# Patient Record
Sex: MALE | Race: WHITE | Employment: OTHER | URBAN - METROPOLITAN AREA
[De-identification: names, ages, dates, MRNs, and addresses within clinical notes are randomized per-mention and may not be internally consistent; named-entity substitution may affect disease eponyms.]

---

## 2019-05-24 ENCOUNTER — HOSPITAL ENCOUNTER (OUTPATIENT)
Facility: CLINIC | Age: 74
Discharge: HOME | End: 2019-05-24
Attending: COLON & RECTAL SURGERY | Admitting: COLON & RECTAL SURGERY
Payer: COMMERCIAL

## 2019-05-24 VITALS
WEIGHT: 207 LBS | DIASTOLIC BLOOD PRESSURE: 78 MMHG | HEART RATE: 61 BPM | BODY MASS INDEX: 30.66 KG/M2 | RESPIRATION RATE: 27 BRPM | HEIGHT: 69 IN | SYSTOLIC BLOOD PRESSURE: 122 MMHG | OXYGEN SATURATION: 95 %

## 2019-05-24 LAB — COLONOSCOPY: NORMAL

## 2019-05-24 PROCEDURE — 88305 TISSUE EXAM BY PATHOLOGIST: CPT | Performed by: COLON & RECTAL SURGERY

## 2019-05-24 PROCEDURE — 27210582 ZZH DEVICE CLIP RESOLUTION, EACH: Performed by: COLON & RECTAL SURGERY

## 2019-05-24 PROCEDURE — 88305 TISSUE EXAM BY PATHOLOGIST: CPT | Mod: 26 | Performed by: COLON & RECTAL SURGERY

## 2019-05-24 PROCEDURE — 25800030 ZZH RX IP 258 OP 636: Performed by: COLON & RECTAL SURGERY

## 2019-05-24 PROCEDURE — 99153 MOD SED SAME PHYS/QHP EA: CPT | Performed by: COLON & RECTAL SURGERY

## 2019-05-24 PROCEDURE — 25000128 H RX IP 250 OP 636: Performed by: COLON & RECTAL SURGERY

## 2019-05-24 PROCEDURE — G0500 MOD SEDAT ENDO SERVICE >5YRS: HCPCS | Performed by: COLON & RECTAL SURGERY

## 2019-05-24 PROCEDURE — 45385 COLONOSCOPY W/LESION REMOVAL: CPT | Performed by: COLON & RECTAL SURGERY

## 2019-05-24 PROCEDURE — 45380 COLONOSCOPY AND BIOPSY: CPT | Mod: XU | Performed by: COLON & RECTAL SURGERY

## 2019-05-24 RX ORDER — SODIUM CHLORIDE 9 MG/ML
INJECTION, SOLUTION INTRAVENOUS CONTINUOUS PRN
Status: DISCONTINUED | OUTPATIENT
Start: 2019-05-24 | End: 2019-05-24 | Stop reason: HOSPADM

## 2019-05-24 RX ORDER — NALOXONE HYDROCHLORIDE 0.4 MG/ML
.1-.4 INJECTION, SOLUTION INTRAMUSCULAR; INTRAVENOUS; SUBCUTANEOUS
Status: DISCONTINUED | OUTPATIENT
Start: 2019-05-24 | End: 2019-05-24 | Stop reason: HOSPADM

## 2019-05-24 RX ORDER — FLUMAZENIL 0.1 MG/ML
0.2 INJECTION, SOLUTION INTRAVENOUS
Status: DISCONTINUED | OUTPATIENT
Start: 2019-05-24 | End: 2019-05-24 | Stop reason: HOSPADM

## 2019-05-24 RX ORDER — ONDANSETRON 2 MG/ML
4 INJECTION INTRAMUSCULAR; INTRAVENOUS
Status: DISCONTINUED | OUTPATIENT
Start: 2019-05-24 | End: 2019-05-24 | Stop reason: HOSPADM

## 2019-05-24 RX ORDER — ONDANSETRON 4 MG/1
4 TABLET, ORALLY DISINTEGRATING ORAL EVERY 6 HOURS PRN
Status: DISCONTINUED | OUTPATIENT
Start: 2019-05-24 | End: 2019-05-24 | Stop reason: HOSPADM

## 2019-05-24 RX ORDER — FENTANYL CITRATE 50 UG/ML
INJECTION, SOLUTION INTRAMUSCULAR; INTRAVENOUS PRN
Status: DISCONTINUED | OUTPATIENT
Start: 2019-05-24 | End: 2019-05-24 | Stop reason: HOSPADM

## 2019-05-24 RX ORDER — LIDOCAINE 40 MG/G
CREAM TOPICAL
Status: DISCONTINUED | OUTPATIENT
Start: 2019-05-24 | End: 2019-05-24 | Stop reason: HOSPADM

## 2019-05-24 RX ORDER — ONDANSETRON 2 MG/ML
4 INJECTION INTRAMUSCULAR; INTRAVENOUS EVERY 6 HOURS PRN
Status: DISCONTINUED | OUTPATIENT
Start: 2019-05-24 | End: 2019-05-24 | Stop reason: HOSPADM

## 2019-05-24 RX ORDER — LISINOPRIL 10 MG/1
10 TABLET ORAL DAILY
COMMUNITY
End: 2021-01-01

## 2019-05-24 ASSESSMENT — MIFFLIN-ST. JEOR: SCORE: 1679.33

## 2019-05-24 NOTE — H&P
Pre-Endoscopy History and Physical     Amado Gutierrez MRN# [id number]   YOB: 1945 Age: 74 year old     Date of Procedure: (Not on file)  Primary care provider: No primary care provider on file.  Type of Endoscopy: Colonoscopy  Reason for Procedure: Positive cologuard  Type of Anesthesia Anticipated: Moderate Sedation    HPI:    Amado is a 74 year old male who will be undergoing the above procedure.      A history and physical has been performed. The patient's medications and allergies have been reviewed. The risks and benefits of the procedure and the sedation options and risks were discussed with the patient.  All questions were answered and informed consent was obtained.      He denies a personal or family history of anesthesia complications or bleeding disorders.     No Known Allergies       No current facility-administered medications on file prior to encounter.   Current Outpatient Medications on File Prior to Encounter:  lisinopril (PRINIVIL/ZESTRIL) 10 MG tablet Take 10 mg by mouth daily       There is no problem list on file for this patient.       Past Medical History:   Diagnosis Date     Hypertension         History reviewed. No pertinent surgical history.    Social History     Tobacco Use     Smoking status: Never Smoker     Smokeless tobacco: Never Used   Substance Use Topics     Alcohol use: Not on file       History reviewed. No pertinent family history.    REVIEW OF SYSTEMS:     5 point ROS negative except as noted above in HPI, including Gen., Resp., CV, GI &  system review.      PHYSICAL EXAM:   There were no vitals taken for this visit. There is no height or weight on file to calculate BMI.   GENERAL APPEARANCE: healthy and alert  MENTAL STATUS: alert  RESP: lungs clear to auscultation - no rales, rhonchi or wheezes  CV: regular rates and rhythm and normal S1 S2, no S3 or S4      IMPRESSION   ASA Class 2 - Mild systemic disease        PLAN:     Plan for colonoscopy. We discussed  the risks, benefits and alternatives and the patient wished to proceed.    The above has been forwarded to the consulting provider.      Eric Andrews MD  [facility]  Phone: [phone]  May 24, 2019

## 2019-05-28 LAB — COPATH REPORT: NORMAL

## 2021-01-01 ENCOUNTER — HOSPITAL ENCOUNTER (INPATIENT)
Facility: CLINIC | Age: 76
LOS: 2 days | Discharge: HOME HEALTH | DRG: 603 | End: 2021-10-18
Attending: EMERGENCY MEDICINE | Admitting: HOSPITALIST
Payer: COMMERCIAL

## 2021-01-01 ENCOUNTER — OFFICE VISIT (OUTPATIENT)
Dept: URGENT CARE | Facility: URGENT CARE | Age: 76
End: 2021-01-01
Payer: COMMERCIAL

## 2021-01-01 ENCOUNTER — APPOINTMENT (OUTPATIENT)
Dept: CT IMAGING | Facility: CLINIC | Age: 76
DRG: 603 | End: 2021-01-01
Attending: EMERGENCY MEDICINE
Payer: COMMERCIAL

## 2021-01-01 ENCOUNTER — APPOINTMENT (OUTPATIENT)
Dept: OCCUPATIONAL THERAPY | Facility: CLINIC | Age: 76
DRG: 603 | End: 2021-01-01
Attending: HOSPITALIST
Payer: COMMERCIAL

## 2021-01-01 VITALS
SYSTOLIC BLOOD PRESSURE: 136 MMHG | TEMPERATURE: 98.3 F | DIASTOLIC BLOOD PRESSURE: 88 MMHG | HEART RATE: 86 BPM | OXYGEN SATURATION: 95 %

## 2021-01-01 VITALS
BODY MASS INDEX: 30.14 KG/M2 | HEIGHT: 69 IN | DIASTOLIC BLOOD PRESSURE: 86 MMHG | OXYGEN SATURATION: 93 % | RESPIRATION RATE: 16 BRPM | TEMPERATURE: 97.5 F | WEIGHT: 203.5 LBS | SYSTOLIC BLOOD PRESSURE: 158 MMHG | HEART RATE: 65 BPM

## 2021-01-01 DIAGNOSIS — L03.211 FACIAL CELLULITIS: Primary | ICD-10-CM

## 2021-01-01 DIAGNOSIS — L03.90 CELLULITIS, UNSPECIFIED CELLULITIS SITE: ICD-10-CM

## 2021-01-01 DIAGNOSIS — L03.211 CELLULITIS OF FACE: Primary | ICD-10-CM

## 2021-01-01 LAB
ALBUMIN SERPL-MCNC: 3 G/DL (ref 3.4–5)
ALBUMIN SERPL-MCNC: 3.2 G/DL (ref 3.4–5)
ALBUMIN SERPL-MCNC: 3.3 G/DL (ref 3.4–5)
ALP SERPL-CCNC: 55 U/L (ref 40–150)
ALP SERPL-CCNC: 56 U/L (ref 40–150)
ALP SERPL-CCNC: 65 U/L (ref 40–150)
ALT SERPL W P-5'-P-CCNC: 33 U/L (ref 0–70)
ALT SERPL W P-5'-P-CCNC: 34 U/L (ref 0–70)
ALT SERPL W P-5'-P-CCNC: 35 U/L (ref 0–70)
ANION GAP SERPL CALCULATED.3IONS-SCNC: 5 MMOL/L (ref 3–14)
ANION GAP SERPL CALCULATED.3IONS-SCNC: 6 MMOL/L (ref 3–14)
ANION GAP SERPL CALCULATED.3IONS-SCNC: 7 MMOL/L (ref 3–14)
AST SERPL W P-5'-P-CCNC: 20 U/L (ref 0–45)
AST SERPL W P-5'-P-CCNC: 23 U/L (ref 0–45)
AST SERPL W P-5'-P-CCNC: 32 U/L (ref 0–45)
BACTERIA BLD CULT: NO GROWTH
BACTERIA BLD CULT: NO GROWTH
BASOPHILS # BLD AUTO: 0 10E3/UL (ref 0–0.2)
BASOPHILS NFR BLD AUTO: 0 %
BILIRUB SERPL-MCNC: 0.5 MG/DL (ref 0.2–1.3)
BILIRUB SERPL-MCNC: 0.6 MG/DL (ref 0.2–1.3)
BILIRUB SERPL-MCNC: 0.7 MG/DL (ref 0.2–1.3)
BUN SERPL-MCNC: 10 MG/DL (ref 7–30)
BUN SERPL-MCNC: 17 MG/DL (ref 7–30)
BUN SERPL-MCNC: 9 MG/DL (ref 7–30)
CALCIUM SERPL-MCNC: 8.5 MG/DL (ref 8.5–10.1)
CALCIUM SERPL-MCNC: 8.8 MG/DL (ref 8.5–10.1)
CALCIUM SERPL-MCNC: 9 MG/DL (ref 8.5–10.1)
CHLORIDE BLD-SCNC: 103 MMOL/L (ref 94–109)
CHLORIDE BLD-SCNC: 105 MMOL/L (ref 94–109)
CHLORIDE BLD-SCNC: 106 MMOL/L (ref 94–109)
CO2 SERPL-SCNC: 21 MMOL/L (ref 20–32)
CO2 SERPL-SCNC: 25 MMOL/L (ref 20–32)
CO2 SERPL-SCNC: 26 MMOL/L (ref 20–32)
CREAT SERPL-MCNC: 0.77 MG/DL (ref 0.66–1.25)
CREAT SERPL-MCNC: 0.8 MG/DL (ref 0.66–1.25)
CREAT SERPL-MCNC: 0.88 MG/DL (ref 0.66–1.25)
CREAT SERPL-MCNC: 0.91 MG/DL (ref 0.66–1.25)
CRP SERPL-MCNC: 105 MG/L (ref 0–8)
CRP SERPL-MCNC: 33.5 MG/L (ref 0–8)
CRP SERPL-MCNC: 68.1 MG/L (ref 0–8)
EOSINOPHIL # BLD AUTO: 0.2 10E3/UL (ref 0–0.7)
EOSINOPHIL NFR BLD AUTO: 2 %
ERYTHROCYTE [DISTWIDTH] IN BLOOD BY AUTOMATED COUNT: 12.2 % (ref 10–15)
ERYTHROCYTE [DISTWIDTH] IN BLOOD BY AUTOMATED COUNT: 12.3 % (ref 10–15)
ERYTHROCYTE [DISTWIDTH] IN BLOOD BY AUTOMATED COUNT: 12.4 % (ref 10–15)
ERYTHROCYTE [SEDIMENTATION RATE] IN BLOOD BY WESTERGREN METHOD: 51 MM/HR (ref 0–20)
ERYTHROCYTE [SEDIMENTATION RATE] IN BLOOD BY WESTERGREN METHOD: 53 MM/HR (ref 0–20)
ERYTHROCYTE [SEDIMENTATION RATE] IN BLOOD BY WESTERGREN METHOD: 55 MM/HR (ref 0–20)
GFR SERPL CREATININE-BSD FRML MDRD: 83 ML/MIN/1.73M2
GFR SERPL CREATININE-BSD FRML MDRD: 85 ML/MIN/1.73M2
GFR SERPL CREATININE-BSD FRML MDRD: 88 ML/MIN/1.73M2
GFR SERPL CREATININE-BSD FRML MDRD: 89 ML/MIN/1.73M2
GLUCOSE BLD-MCNC: 103 MG/DL (ref 70–99)
GLUCOSE BLD-MCNC: 122 MG/DL (ref 70–99)
GLUCOSE BLD-MCNC: 146 MG/DL (ref 70–99)
HCT VFR BLD AUTO: 39.1 % (ref 40–53)
HCT VFR BLD AUTO: 40 % (ref 40–53)
HCT VFR BLD AUTO: 40.5 % (ref 40–53)
HGB BLD-MCNC: 13.1 G/DL (ref 13.3–17.7)
HGB BLD-MCNC: 13.7 G/DL (ref 13.3–17.7)
HGB BLD-MCNC: 14.1 G/DL (ref 13.3–17.7)
IMM GRANULOCYTES # BLD: 0.1 10E3/UL
IMM GRANULOCYTES NFR BLD: 1 %
LACTATE SERPL-SCNC: 0.6 MMOL/L (ref 0.7–2)
LYMPHOCYTES # BLD AUTO: 0.7 10E3/UL (ref 0.8–5.3)
LYMPHOCYTES NFR BLD AUTO: 7 %
MCH RBC QN AUTO: 32.8 PG (ref 26.5–33)
MCH RBC QN AUTO: 33.6 PG (ref 26.5–33)
MCH RBC QN AUTO: 33.6 PG (ref 26.5–33)
MCHC RBC AUTO-ENTMCNC: 33.5 G/DL (ref 31.5–36.5)
MCHC RBC AUTO-ENTMCNC: 34.3 G/DL (ref 31.5–36.5)
MCHC RBC AUTO-ENTMCNC: 34.8 G/DL (ref 31.5–36.5)
MCV RBC AUTO: 96 FL (ref 78–100)
MCV RBC AUTO: 98 FL (ref 78–100)
MCV RBC AUTO: 98 FL (ref 78–100)
MONOCYTES # BLD AUTO: 1.1 10E3/UL (ref 0–1.3)
MONOCYTES NFR BLD AUTO: 11 %
NEUTROPHILS # BLD AUTO: 7.3 10E3/UL (ref 1.6–8.3)
NEUTROPHILS NFR BLD AUTO: 79 %
NRBC # BLD AUTO: 0 10E3/UL
NRBC BLD AUTO-RTO: 0 /100
PLATELET # BLD AUTO: 165 10E3/UL (ref 150–450)
PLATELET # BLD AUTO: 188 10E3/UL (ref 150–450)
PLATELET # BLD AUTO: 208 10E3/UL (ref 150–450)
POTASSIUM BLD-SCNC: 3.9 MMOL/L (ref 3.4–5.3)
POTASSIUM BLD-SCNC: 4 MMOL/L (ref 3.4–5.3)
POTASSIUM BLD-SCNC: 4.1 MMOL/L (ref 3.4–5.3)
PROT SERPL-MCNC: 7.6 G/DL (ref 6.8–8.8)
PROT SERPL-MCNC: 7.9 G/DL (ref 6.8–8.8)
PROT SERPL-MCNC: 8.1 G/DL (ref 6.8–8.8)
RBC # BLD AUTO: 4 10E6/UL (ref 4.4–5.9)
RBC # BLD AUTO: 4.08 10E6/UL (ref 4.4–5.9)
RBC # BLD AUTO: 4.2 10E6/UL (ref 4.4–5.9)
SARS-COV-2 RNA RESP QL NAA+PROBE: NEGATIVE
SODIUM SERPL-SCNC: 133 MMOL/L (ref 133–144)
SODIUM SERPL-SCNC: 135 MMOL/L (ref 133–144)
SODIUM SERPL-SCNC: 136 MMOL/L (ref 133–144)
TSH SERPL DL<=0.005 MIU/L-ACNC: 1.1 MU/L (ref 0.4–4)
WBC # BLD AUTO: 6.2 10E3/UL (ref 4–11)
WBC # BLD AUTO: 6.9 10E3/UL (ref 4–11)
WBC # BLD AUTO: 9.3 10E3/UL (ref 4–11)

## 2021-01-01 PROCEDURE — 250N000011 HC RX IP 250 OP 636: Performed by: EMERGENCY MEDICINE

## 2021-01-01 PROCEDURE — 36415 COLL VENOUS BLD VENIPUNCTURE: CPT | Performed by: HOSPITALIST

## 2021-01-01 PROCEDURE — 250N000011 HC RX IP 250 OP 636: Performed by: HOSPITALIST

## 2021-01-01 PROCEDURE — 85027 COMPLETE CBC AUTOMATED: CPT | Performed by: HOSPITALIST

## 2021-01-01 PROCEDURE — C9803 HOPD COVID-19 SPEC COLLECT: HCPCS

## 2021-01-01 PROCEDURE — 87040 BLOOD CULTURE FOR BACTERIA: CPT | Performed by: EMERGENCY MEDICINE

## 2021-01-01 PROCEDURE — 96366 THER/PROPH/DIAG IV INF ADDON: CPT

## 2021-01-01 PROCEDURE — 99285 EMERGENCY DEPT VISIT HI MDM: CPT | Mod: 25

## 2021-01-01 PROCEDURE — 82040 ASSAY OF SERUM ALBUMIN: CPT | Performed by: HOSPITALIST

## 2021-01-01 PROCEDURE — 70487 CT MAXILLOFACIAL W/DYE: CPT

## 2021-01-01 PROCEDURE — 86140 C-REACTIVE PROTEIN: CPT | Performed by: HOSPITALIST

## 2021-01-01 PROCEDURE — 84443 ASSAY THYROID STIM HORMONE: CPT | Performed by: HOSPITALIST

## 2021-01-01 PROCEDURE — 250N000009 HC RX 250: Performed by: EMERGENCY MEDICINE

## 2021-01-01 PROCEDURE — 83605 ASSAY OF LACTIC ACID: CPT | Performed by: EMERGENCY MEDICINE

## 2021-01-01 PROCEDURE — 85025 COMPLETE CBC W/AUTO DIFF WBC: CPT | Performed by: EMERGENCY MEDICINE

## 2021-01-01 PROCEDURE — 250N000013 HC RX MED GY IP 250 OP 250 PS 637: Performed by: HOSPITALIST

## 2021-01-01 PROCEDURE — 99223 1ST HOSP IP/OBS HIGH 75: CPT | Mod: AI | Performed by: HOSPITALIST

## 2021-01-01 PROCEDURE — 258N000003 HC RX IP 258 OP 636: Performed by: HOSPITALIST

## 2021-01-01 PROCEDURE — 99238 HOSP IP/OBS DSCHRG MGMT 30/<: CPT | Performed by: INTERNAL MEDICINE

## 2021-01-01 PROCEDURE — 36415 COLL VENOUS BLD VENIPUNCTURE: CPT | Performed by: EMERGENCY MEDICINE

## 2021-01-01 PROCEDURE — 85652 RBC SED RATE AUTOMATED: CPT | Performed by: HOSPITALIST

## 2021-01-01 PROCEDURE — 120N000001 HC R&B MED SURG/OB

## 2021-01-01 PROCEDURE — 97535 SELF CARE MNGMENT TRAINING: CPT | Mod: GO

## 2021-01-01 PROCEDURE — 96375 TX/PRO/DX INJ NEW DRUG ADDON: CPT

## 2021-01-01 PROCEDURE — 87635 SARS-COV-2 COVID-19 AMP PRB: CPT | Performed by: EMERGENCY MEDICINE

## 2021-01-01 PROCEDURE — 96367 TX/PROPH/DG ADDL SEQ IV INF: CPT

## 2021-01-01 PROCEDURE — 250N000011 HC RX IP 250 OP 636: Performed by: INTERNAL MEDICINE

## 2021-01-01 PROCEDURE — 258N000003 HC RX IP 258 OP 636: Performed by: EMERGENCY MEDICINE

## 2021-01-01 PROCEDURE — 97165 OT EVAL LOW COMPLEX 30 MIN: CPT | Mod: GO

## 2021-01-01 PROCEDURE — 99203 OFFICE O/P NEW LOW 30 MIN: CPT

## 2021-01-01 PROCEDURE — 99207 PR MOONLIGHTING INDICATOR: CPT | Performed by: HOSPITALIST

## 2021-01-01 PROCEDURE — 99233 SBSQ HOSP IP/OBS HIGH 50: CPT | Performed by: HOSPITALIST

## 2021-01-01 PROCEDURE — 82565 ASSAY OF CREATININE: CPT | Performed by: HOSPITALIST

## 2021-01-01 PROCEDURE — 96365 THER/PROPH/DIAG IV INF INIT: CPT | Mod: 59

## 2021-01-01 PROCEDURE — 80053 COMPREHEN METABOLIC PANEL: CPT | Performed by: EMERGENCY MEDICINE

## 2021-01-01 RX ORDER — CLINDAMYCIN PHOSPHATE 900 MG/50ML
900 INJECTION, SOLUTION INTRAVENOUS ONCE
Status: COMPLETED | OUTPATIENT
Start: 2021-01-01 | End: 2021-01-01

## 2021-01-01 RX ORDER — CEPHALEXIN 500 MG/1
500 CAPSULE ORAL 4 TIMES DAILY
Qty: 28 CAPSULE | Refills: 0 | Status: SHIPPED | OUTPATIENT
Start: 2021-01-01 | End: 2021-01-01

## 2021-01-01 RX ORDER — MORPHINE SULFATE 4 MG/ML
6 INJECTION, SOLUTION INTRAMUSCULAR; INTRAVENOUS ONCE
Status: COMPLETED | OUTPATIENT
Start: 2021-01-01 | End: 2021-01-01

## 2021-01-01 RX ORDER — PROCHLORPERAZINE MALEATE 5 MG
5 TABLET ORAL EVERY 6 HOURS PRN
Status: DISCONTINUED | OUTPATIENT
Start: 2021-01-01 | End: 2021-01-01 | Stop reason: HOSPADM

## 2021-01-01 RX ORDER — IOPAMIDOL 755 MG/ML
80 INJECTION, SOLUTION INTRAVASCULAR ONCE
Status: COMPLETED | OUTPATIENT
Start: 2021-01-01 | End: 2021-01-01

## 2021-01-01 RX ORDER — OMEPRAZOLE 10 MG/1
20 CAPSULE, DELAYED RELEASE ORAL DAILY
COMMUNITY
End: 2021-01-01

## 2021-01-01 RX ORDER — DONEPEZIL HYDROCHLORIDE 10 MG/1
10 TABLET, FILM COATED ORAL AT BEDTIME
COMMUNITY

## 2021-01-01 RX ORDER — ACETAMINOPHEN 325 MG/1
325 TABLET ORAL EVERY 4 HOURS PRN
Status: DISCONTINUED | OUTPATIENT
Start: 2021-01-01 | End: 2021-01-01 | Stop reason: HOSPADM

## 2021-01-01 RX ORDER — ONDANSETRON 2 MG/ML
4 INJECTION INTRAMUSCULAR; INTRAVENOUS EVERY 6 HOURS PRN
Status: DISCONTINUED | OUTPATIENT
Start: 2021-01-01 | End: 2021-01-01 | Stop reason: HOSPADM

## 2021-01-01 RX ORDER — ONDANSETRON 2 MG/ML
4 INJECTION INTRAMUSCULAR; INTRAVENOUS EVERY 30 MIN PRN
Status: DISCONTINUED | OUTPATIENT
Start: 2021-01-01 | End: 2021-01-01

## 2021-01-01 RX ORDER — LIDOCAINE 40 MG/G
CREAM TOPICAL
Status: DISCONTINUED | OUTPATIENT
Start: 2021-01-01 | End: 2021-01-01 | Stop reason: HOSPADM

## 2021-01-01 RX ORDER — ACETAMINOPHEN 650 MG/1
650 SUPPOSITORY RECTAL EVERY 6 HOURS PRN
Qty: 30 SUPPOSITORY | Refills: 0 | Status: SHIPPED | OUTPATIENT
Start: 2021-01-01

## 2021-01-01 RX ORDER — LISINOPRIL 40 MG/1
40 TABLET ORAL DAILY
COMMUNITY

## 2021-01-01 RX ORDER — MULTIVITAMIN,THERAPEUTIC
1 TABLET ORAL DAILY
COMMUNITY

## 2021-01-01 RX ORDER — PROCHLORPERAZINE 25 MG
12.5 SUPPOSITORY, RECTAL RECTAL EVERY 12 HOURS PRN
Status: DISCONTINUED | OUTPATIENT
Start: 2021-01-01 | End: 2021-01-01 | Stop reason: HOSPADM

## 2021-01-01 RX ORDER — ACETAMINOPHEN 325 MG/1
650 TABLET ORAL EVERY 6 HOURS PRN
Status: DISCONTINUED | OUTPATIENT
Start: 2021-01-01 | End: 2021-01-01

## 2021-01-01 RX ORDER — PIPERACILLIN SODIUM, TAZOBACTAM SODIUM 3; .375 G/15ML; G/15ML
3.38 INJECTION, POWDER, LYOPHILIZED, FOR SOLUTION INTRAVENOUS EVERY 6 HOURS
Status: DISCONTINUED | OUTPATIENT
Start: 2021-01-01 | End: 2021-01-01

## 2021-01-01 RX ORDER — ACETAMINOPHEN 650 MG/1
650 SUPPOSITORY RECTAL EVERY 6 HOURS PRN
Status: DISCONTINUED | OUTPATIENT
Start: 2021-01-01 | End: 2021-01-01 | Stop reason: HOSPADM

## 2021-01-01 RX ORDER — AMPICILLIN AND SULBACTAM 2; 1 G/1; G/1
3 INJECTION, POWDER, FOR SOLUTION INTRAMUSCULAR; INTRAVENOUS EVERY 6 HOURS
Status: DISCONTINUED | OUTPATIENT
Start: 2021-01-01 | End: 2021-01-01 | Stop reason: HOSPADM

## 2021-01-01 RX ORDER — ONDANSETRON 4 MG/1
4 TABLET, ORALLY DISINTEGRATING ORAL EVERY 6 HOURS PRN
Status: DISCONTINUED | OUTPATIENT
Start: 2021-01-01 | End: 2021-01-01 | Stop reason: HOSPADM

## 2021-01-01 RX ADMIN — ACETAMINOPHEN 650 MG: 325 TABLET, FILM COATED ORAL at 07:59

## 2021-01-01 RX ADMIN — ACETAMINOPHEN 650 MG: 325 TABLET, FILM COATED ORAL at 14:44

## 2021-01-01 RX ADMIN — ACETAMINOPHEN 325 MG: 325 TABLET, FILM COATED ORAL at 18:07

## 2021-01-01 RX ADMIN — VANCOMYCIN HYDROCHLORIDE 750 MG: 5 INJECTION, POWDER, LYOPHILIZED, FOR SOLUTION INTRAVENOUS at 18:49

## 2021-01-01 RX ADMIN — MORPHINE SULFATE 6 MG: 4 INJECTION INTRAVENOUS at 02:07

## 2021-01-01 RX ADMIN — SODIUM CHLORIDE 60 ML: 900 INJECTION INTRAVENOUS at 00:14

## 2021-01-01 RX ADMIN — AMPICILLIN SODIUM AND SULBACTAM SODIUM 3 G: 2; 1 INJECTION, POWDER, FOR SOLUTION INTRAMUSCULAR; INTRAVENOUS at 21:11

## 2021-01-01 RX ADMIN — AMPICILLIN SODIUM AND SULBACTAM SODIUM 3 G: 2; 1 INJECTION, POWDER, FOR SOLUTION INTRAMUSCULAR; INTRAVENOUS at 04:34

## 2021-01-01 RX ADMIN — ENOXAPARIN SODIUM 40 MG: 40 INJECTION SUBCUTANEOUS at 16:14

## 2021-01-01 RX ADMIN — ONDANSETRON 4 MG: 2 INJECTION INTRAMUSCULAR; INTRAVENOUS at 02:06

## 2021-01-01 RX ADMIN — PIPERACILLIN SODIUM AND TAZOBACTAM SODIUM 3.38 G: 3; .375 INJECTION, POWDER, LYOPHILIZED, FOR SOLUTION INTRAVENOUS at 09:22

## 2021-01-01 RX ADMIN — VANCOMYCIN HYDROCHLORIDE 750 MG: 5 INJECTION, POWDER, LYOPHILIZED, FOR SOLUTION INTRAVENOUS at 06:58

## 2021-01-01 RX ADMIN — PIPERACILLIN SODIUM AND TAZOBACTAM SODIUM 3.38 G: 3; .375 INJECTION, POWDER, LYOPHILIZED, FOR SOLUTION INTRAVENOUS at 21:40

## 2021-01-01 RX ADMIN — AMPICILLIN SODIUM AND SULBACTAM SODIUM 3 G: 2; 1 INJECTION, POWDER, FOR SOLUTION INTRAMUSCULAR; INTRAVENOUS at 16:47

## 2021-01-01 RX ADMIN — PIPERACILLIN SODIUM AND TAZOBACTAM SODIUM 3.38 G: 3; .375 INJECTION, POWDER, LYOPHILIZED, FOR SOLUTION INTRAVENOUS at 04:15

## 2021-01-01 RX ADMIN — PIPERACILLIN SODIUM AND TAZOBACTAM SODIUM 3.38 G: 3; .375 INJECTION, POWDER, LYOPHILIZED, FOR SOLUTION INTRAVENOUS at 09:46

## 2021-01-01 RX ADMIN — CLINDAMYCIN PHOSPHATE 900 MG: 900 INJECTION, SOLUTION INTRAVENOUS at 00:44

## 2021-01-01 RX ADMIN — IOPAMIDOL 80 ML: 755 INJECTION, SOLUTION INTRAVENOUS at 00:14

## 2021-01-01 RX ADMIN — PIPERACILLIN SODIUM AND TAZOBACTAM SODIUM 3.38 G: 3; .375 INJECTION, POWDER, LYOPHILIZED, FOR SOLUTION INTRAVENOUS at 15:48

## 2021-01-01 RX ADMIN — PIPERACILLIN SODIUM AND TAZOBACTAM SODIUM 3.38 G: 3; .375 INJECTION, POWDER, LYOPHILIZED, FOR SOLUTION INTRAVENOUS at 03:10

## 2021-01-01 RX ADMIN — ENOXAPARIN SODIUM 40 MG: 40 INJECTION SUBCUTANEOUS at 18:14

## 2021-01-01 RX ADMIN — VANCOMYCIN HYDROCHLORIDE 2000 MG: 5 INJECTION, POWDER, LYOPHILIZED, FOR SOLUTION INTRAVENOUS at 03:47

## 2021-01-01 RX ADMIN — AMPICILLIN SODIUM AND SULBACTAM SODIUM 3 G: 2; 1 INJECTION, POWDER, FOR SOLUTION INTRAMUSCULAR; INTRAVENOUS at 10:05

## 2021-01-01 RX ADMIN — ACETAMINOPHEN 325 MG: 325 TABLET, FILM COATED ORAL at 21:21

## 2021-01-01 ASSESSMENT — ACTIVITIES OF DAILY LIVING (ADL)
PREVIOUS_RESPONSIBILITIES: MEAL PREP;HOUSEKEEPING;SHOPPING;MEDICATION MANAGEMENT;DRIVING
ADLS_ACUITY_SCORE: 5
ADLS_ACUITY_SCORE: 5
DOING_ERRANDS_INDEPENDENTLY_DIFFICULTY: NO
ADLS_ACUITY_SCORE: 5
TOILETING_ISSUES: NO
ADLS_ACUITY_SCORE: 5

## 2021-01-01 ASSESSMENT — MIFFLIN-ST. JEOR
SCORE: 1601.29
SCORE: 1653.45

## 2021-10-16 PROBLEM — L03.90 CELLULITIS, UNSPECIFIED CELLULITIS SITE: Status: ACTIVE | Noted: 2021-01-01

## 2021-10-16 PROBLEM — L03.90 CELLULITIS: Status: ACTIVE | Noted: 2021-01-01

## 2021-10-16 NOTE — PROGRESS NOTES
RECEIVING UNIT ED HANDOFF REVIEW    ED Nurse Handoff Report was reviewed by: Vielka Watts RN on October 16, 2021 at 1:11 PM

## 2021-10-16 NOTE — PLAN OF CARE
Summary:   Facial cellulitis  DATE & TIME: 10/16 Days  Cognitive Concerns/ Orientation : Alzheimers  BEHAVIOR & AGGRESSION TOOL COLOR: green  ABNL VS/O2: VSS, RA  MOBILITY: Independent, confusion (bed alarm and fall risk)  PAIN MANAGMENT: PRN tylenol @ 1445  DIET: Regular  BOWEL/BLADDER: Continent B&B  DRAIN/DEVICES: PIV left hand  SKIN: rash/cellulitis of face  D/C DAY/GOALS/PLACE: Goal to discharge home  OTHER IMPORTANT INFO: PRN tylenol at 1445. Pt is hesitant about lovanox injection. Daughter is a L&D nurse at [employer], will be by later this afternoon.

## 2021-10-16 NOTE — ED NOTES
"[facility]  ED Nurse Handoff Report    ED Chief complaint: Rash (Rash on forehead and around eyes since yesterday, +itching. Denies diff. breathing or swollowing. No know exposure or new medications/creams applied to area. )      ED Diagnosis:   Final diagnoses:   Cellulitis, unspecified cellulitis site       Code Status: Admitting provider to address     Allergies: No Known Allergies    Patient Story: Pt is a 76 year old male with a history of Alzheimer's disease and hypertension who presents with a rash. Pt developed a rash to his left ear yesterday. This morning, when he woke up, he noticed the rash had spread over his forehead. Throughout the day, the rash continued to spread and is currently covering his forehead and left ear,edema noted down to his eye lids, the bridge of his nose.     Focused Assessment:  Pt is awake, alert and orientated X 3, needs slight repeating of information due to his dementia, slightly forgetful.     Treatments and/or interventions provided: Labs, CT, IV antibiotics.     Patient's response to treatments and/or interventions: Pt tolerated well.     To be done/followed up on inpatient unit:      Does this patient have any cognitive concerns?: Forgetful    Activity level - Baseline/Home:  Independent  Activity Level - Current:   Stand with Assist    Patient's Preferred language: English   Needed?: No    Isolation: None  Infection: Not Applicable  Patient tested for COVID 19 prior to admission: YES  Bariatric?: No    Vital Signs:   Vitals:    10/15/21 2151 10/15/21 2330   BP: (!) 159/81 (!) 141/93   Pulse: 90 78   Resp: 18    Temp: 99  F (37.2  C) 98.5  F (36.9  C)   TempSrc: Oral Oral   SpO2: 98% 97%   Weight: 87.1 kg (192 lb)    Height: 1.753 m (5' 9\")        Cardiac Rhythm:     Was the PSS-3 completed:   Yes  What interventions are required if any?               Family Comments: Daughter who is labor and delivery nurse was at the bedside, very helpful with " information.   OBS brochure/video discussed/provided to patient/family: No              Name of person given brochure if not patient:               Relationship to patient:     For the majority of the shift this patient's behavior was Green.   Behavioral interventions performed were .    ED NURSE PHONE NUMBER: *[phone]

## 2021-10-16 NOTE — INTERIM SUMMARY
Please refer to the H&P note form earlier today for the details of this presentation , in brief, Amado Gutierrez is a 76 year old male who presents today for facial cellulitis.   It looks like the facial erythema has improved since admission.   will continue with the current Abx regimen PIP/Tazo.    Jesse Arambula MD  [phone] (P)

## 2021-10-16 NOTE — H&P
[facility]    History and Physical - Hospitalist Service       Date of Admission:  10/15/2021    Assessment & Plan      Amado Gutierrez is a 76 year old male admitted on 10/15/2021. He has a PMH most remarkable for HTN, Alzheimer's disease who presented to the ER with a facial rash. He was admitted for treatment of facial cellulitis.    1. Cellulitis of the face. Patient has a two day history of increasing facial rash that started from his left ear. No crepitus, or concerning features on CT scan. Lab evaluation unremarkable.  --Discussed with ER nurse who will outline boarders of rash to track its growth  --Blood cultures drawn in ER  --Was given clindamycin in the ER  --Due to severity of rash on exam, will broaden patient to vancomycin/zosyn for the time being.  --ID consult, recs appreciated  --Trend labs (CRP/ESR), add on inflammatory markers to ER labs    Chronic Issues: Awaiting Pharmacy Med Rec  1. GERD  2. Alzheimer's Disease. Mildly forgetful  3. Essential HTN     Diet:   General Diet  DVT Prophylaxis: Enoxaparin (Lovenox) SQ  Interiano Catheter: Not present  Central Lines: None  Code Status:   Full Code, Discussed and Confirmed with Patient on Admission    Clinically Significant Risk Factors Present on Admission                   Disposition Plan   Expected discharge: Patient is currently admitted with cellulitis of the face, I suspect he will be admitted for 1-2 days and can likely discharge once an oral antibiotic plan is established. I have consulted with PT/OT for discharge planning.     The patient's care was discussed with the Bedside Nurse and Patient.    Vasquez Mc MD PhD  [facility]  Securely message with the Vocera Web Console (learn more here)  Text page via [facility] Paging/Directory      ______________________________________________________________________    Chief Complaint   Facial Rash    History is obtained from the patient and chart  review    History of Present Illness   Amado Gutierrez is a 76 year old male admitted on 10/15/2021. He has a PMH most remarkable for HTN, Alzheimer's disease who presented to the ER with a facial rash. He was admitted for treatment of facial cellulitis.    Patient reports that two days ago, he developed a rash at the base of his left ear and that it gradually spread over the next two days. His daughter brought him in to see urgent care today and he was referred to the ER for evaluation. His main complaint is itchiness, however, he tells me the pain is quite mild. He has no other symptoms--no fevers, chills, chest pain, SOB, abdominal pain, nausea, vomiting, diarrhea. No changes in vision.    Review of Systems    The 10 point Review of Systems is negative other than noted in the HPI or here.    Past Medical History    I have reviewed this patient's medical history and updated it with pertinent information if needed.   Past Medical History:   Diagnosis Date     Hypertension        Past Surgical History   I have reviewed this patient's surgical history and updated it with pertinent information if needed.  Past Surgical History:   Procedure Laterality Date     COLONOSCOPY N/A 5/24/2019    Procedure: COLONOSCOPY, WITH POLYPECTOMY AND BIOPSY;  Surgeon: Eric Andrews MD;  Location:  GI     ORTHOPEDIC SURGERY      right rotator cuff       Social History   I have reviewed this patient's social history and updated it with pertinent information if needed.  Social History     Tobacco Use     Smoking status: Never Smoker     Smokeless tobacco: Never Used   Substance Use Topics     Alcohol use: Not on file     Drug use: Never       Family History     Reviewed, non-contributatory    Prior to Admission Medications   Prior to Admission Medications   Prescriptions Last Dose Informant Patient Reported? Taking?   lisinopril (PRINIVIL/ZESTRIL) 10 MG tablet   Yes No   Sig: Take 10 mg by mouth daily   omeprazole (PRILOSEC) 10 MG   capsule   Yes No   Sig: Take 20 mg by mouth daily      Facility-Administered Medications: None     Allergies   No Known Allergies    Physical Exam   Vital Signs: Temp: 98.5  F (36.9  C) Temp src: Oral BP: (!) 141/93 Pulse: 78   Resp: 18 SpO2: 97 %      Weight: 192 lbs 0 oz    General Appearance: Well appearing, no distress  Eyes: NAHUM, EOMI  HEENT: NC, AT. MMM.   Respiratory: CTAB, normal work of breathing  Cardiovascular: Regular Rate and Rhythm, normal S1, S2. No murmurs, rubs, gallops  GI: Soft, non-tender, non-distedned  Lymph/Hematologic: No asymetric swelling, edema. No bruising.  Genitourinary: Deferred  Skin: No rashes, lesions, wounds.  Musculoskeletal: Warm, well perfused  Neurologic: AOx4, CNII-XII intact.  Psychiatric: Euthymic. Mood appropriate.     Data   Data reviewed today: I reviewed all medications, new labs and imaging results over the last 24 hours. I personally reviewed the head CT image(s) showing diffuse soft tissue swelling, no concerns for deep involvement or any abscesses.    Recent Labs   Lab 10/15/21  2346   WBC 9.3   HGB 13.7   MCV 98         POTASSIUM 3.9   CHLORIDE 103   CO2 26   BUN 17   CR 0.88   ANIONGAP 6   SUSAN 8.8   *   ALBUMIN 3.3*   PROTTOTAL 8.1   BILITOTAL 0.5   ALKPHOS 65   ALT 35   AST 20

## 2021-10-16 NOTE — PHARMACY-ADMISSION MEDICATION HISTORY
Pharmacy Medication History  Admission medication history interview status for the 10/15/2021  admission is complete. See EPIC admission navigator for prior to admission medications     Location of Interview: Patient room  Medication history sources: Patient and Surescripts    Significant changes made to the medication list:  Removed omeprazole - patient states that he does not take this medication.     In the past week, patient estimated taking medication this percent of the time: greater than 90%    Medication reconciliation completed by provider prior to medication history? No    Time spent in this activity: 20 minutes    Prior to Admission medications    Medication Sig Last Dose Taking? Auth Provider   donepezil (ARICEPT) 10 MG tablet Take 10 mg by mouth At Bedtime 10/15/2021 at Unknown time Yes Unknown, Entered By History   lisinopril (ZESTRIL) 40 MG tablet Take 40 mg by mouth daily 10/15/2021 at Unknown time Yes Unknown, Entered By History   multivitamin, therapeutic (THERA-VIT) TABS tablet Take 1 tablet by mouth daily 10/15/2021 at Unknown time Yes Unknown, Entered By History       The information provided in this note is only as accurate as the sources available at the time of update(s)

## 2021-10-16 NOTE — PROGRESS NOTES
SUBJECTIVE:  Amado Gutierrez is a 76 year old male who presents to the clinic today for a rash. Time line is unclear but woke with redness lt ear this am and has worsened throughout the day according to his daughter. No fever.    Associated symptoms include: nothing.    Past Medical History:   Diagnosis Date     Hypertension      Current Outpatient Medications   Medication Sig Dispense Refill     omeprazole (PRILOSEC) 10 MG DR capsule Take 20 mg by mouth daily       lisinopril (PRINIVIL/ZESTRIL) 10 MG tablet Take 10 mg by mouth daily       History   Smoking Status     Never Smoker   Smokeless Tobacco     Never Used       ROS:  Review of systems negative except as stated above.    EXAM:   /88   Pulse 86   Temp 98.3  F (36.8  C) (Tympanic)   SpO2 95%   GENERAL: alert, no acute distress.  SKIN: Rash description:    Distribution: red and edematous from left ear to rt forehead.  Crosses midline.  Swell under his eyes is new in the last 3-4 hours.  ASSESSMENT:  Cellulitis face with rapid spread    PLAN: potentially dangerous situation with facial cellulitis.  Recommend to ED immediately.  I spoke with Dr Reno at [facility] ED.

## 2021-10-16 NOTE — ED TRIAGE NOTES
Rash on forehead and around eyes since yesterday, +itching. Denies diff. breathing or swollowing. No know exposure or new medications/creams applied to area.

## 2021-10-16 NOTE — PHARMACY-VANCOMYCIN DOSING SERVICE
"Pharmacy Vancomycin Initial Note  Date of Service 2021  Patient's  1945  76 year old, male    Indication: Skin and Soft Tissue Infection    Current estimated CrCl = Estimated Creatinine Clearance: 80.5 mL/min (based on SCr of 0.88 mg/dL).    Creatinine for last 3 days  10/15/2021: 11:46 PM Creatinine 0.88 mg/dL    Recent Vancomycin Level(s) for last 3 days  No results found for requested labs within last 72 hours.      Vancomycin IV Administrations (past 72 hours)      No vancomycin orders with administrations in past 72 hours.                Nephrotoxins and other renal medications (From now, onward)    Start     Dose/Rate Route Frequency Ordered Stop    10/16/21 1500  vancomycin (VANCOCIN) 750 mg in sodium chloride 0.9 % 250 mL intermittent infusion      750 mg  over 90 Minutes Intravenous EVERY 12 HOURS 10/16/21 0235      10/16/21 0240  vancomycin 2000 mg in 0.9% NaCl 500 ml intermittent infusion 2,000 mg      2,000 mg  over 2 Hours Intravenous ONCE 10/16/21 0235      10/16/21 0225  piperacillin-tazobactam (ZOSYN) 3.375 g vial to attach to  mL bag     Note to Pharmacy: For [facility], [facility] and [facility]: For Zosyn-naive patients, use the \"Zosyn initial dose + extended infusion\" order panel.    3.375 g  over 30 Minutes Intravenous EVERY 6 HOURS 10/16/21 022            Contrast Orders - past 72 hours (72h ago, onward)    Start     Dose/Rate Route Frequency Ordered Stop    10/16/21 0010  iopamidol (ISOVUE-370) solution 80 mL      80 mL Intravenous ONCE 10/16/21 0005 10/16/21 0014          Loading dose: 2000 mg at 02:30 10/16/2021.  Regimen: 750 mg IV every 12 hours.  Start time: 02:34 on 10/16/2021  Exposure target: AUC24 (range)400-600 mg/L.hr   AUC24,ss: 416 mg/L.hr  Probability of AUC24 > 400: 54 %  Ctrough,ss: 13.7 mg/L  Probability of Ctrough,ss > 20: 19 %  Probability of nephrotoxicity (Lodise KEON ): 9 %          Plan:  1. Start vancomycin  750 mg IV q12h after 2gm load.   2. Vancomycin " monitoring method: AUC  3. Vancomycin therapeutic monitoring goal: 400-600 mg*h/L  4. Pharmacy will check vancomycin levels as appropriate in 1-3 Days.    5. Serum creatinine levels will be ordered daily for the first week of therapy and at least twice weekly for subsequent weeks.      Jayant Mccann [facility]

## 2021-10-16 NOTE — ED PROVIDER NOTES
"  History   Chief Complaint:  Rash     HPI   Amado Gutierrez is a 76 year old male with a history of Alzheimer's disease and hypertension who presents with a rash. Per report, the patient developed a rash to his left ear yesterday. This morning, when he woke up, he noticed the rash had spread over his forehead. Throughout the day, the rash continued to spread and is currently covering his forehead and left ear. His extremities are unaffected. He notes the effected area is sensitive but does not hurt. He states he has never had a rash like this before. He does not have a history of burns to the face. He has not recently started any new medications. He has not been increasingly thirsty recently.     Review of Systems   Skin: Positive for rash.        No pain to affected area   All other systems reviewed and are negative.    Allergies:  The patient has no known allergies.    Medications:    Lisinopril  Prolosec    Past Medical History:    Hypertension  Alzheimer's disease  Inguinal hernia  Pneumonia    Past Surgical History:    Colonoscopy  Rotator cuff surgery    Family History:    Mother: Parkinson's     Social History:  The patient was accompanied to the ED by a family member.    Physical Exam     Patient Vitals for the past 24 hrs:   BP Temp Temp src Pulse Resp SpO2 Height Weight   10/15/21 2330 (!) 141/93 98.5  F (36.9  C) Oral 78 -- 97 % -- --   10/15/21 2151 (!) 159/81 99  F (37.2  C) Oral 90 18 98 % 1.753 m (5' 9\") 87.1 kg (192 lb)       Physical Exam  Vitals: reviewed by me  General: Pt seen on [facility], [facility], cooperative, and alert to conversation  Eyes: Tracking well, clear conjunctiva BL  ENT: MMM, midline trachea.   Lungs: No tachypnea, no accessory muscle use. No respiratory distress.   CV: Rate as above  Abd: Soft, non tender, no guarding, no rebound. Non distended  MSK: no joint effusion.  No evidence of trauma  Skin: Has a coalescing dark erythematous rash extending from a swollen left ear " across right forehead and down into inferior aspects of the infraorbital area.  There are no bullae, negative Nikolsky sign, and no induration.  This is an area of warmth and tenderness.  Neuro: Clear speech and no facial droop.  Psych: Not RIS, no e/o AH/VH      Emergency Department Course   Imaging:  CT Facial Bones with Contrast   Final Result   IMPRESSION:    1.  Bilateral frontal scalp, glabella, nasal soft tissues, bilateral periorbital regions, mild bilateral maxillary soft tissues, left lateral facial soft tissues, left external ear demonstrates soft tissue swelling and inflammatory changes concerning for    cellulitis.    2.  No orbital post septal/orbital retrobulbar cellulitis.    3.  No drainable soft tissue abscess.    4.  Fatty replaced left submandibular gland demonstrates mild patchy increased attenuation could suggest regional sparing or left submandibular sialadenitis.   5.  Possible subtle left parotitis.             Laboratory:  Labs Ordered and Resulted from Time of ED Arrival Up to the Time of Departure from the ED   COMPREHENSIVE METABOLIC PANEL - Abnormal; Notable for the following components:       Result Value    Glucose 122 (*)     Albumin 3.3 (*)     All other components within normal limits   CBC WITH PLATELETS AND DIFFERENTIAL - Abnormal; Notable for the following components:    RBC Count 4.08 (*)     MCH 33.6 (*)     Absolute Lymphocytes 0.7 (*)     Absolute Immature Granulocytes 0.1 (*)     All other components within normal limits   LACTIC ACID WHOLE BLOOD - Abnormal; Notable for the following components:    Lactic Acid 0.6 (*)     All other components within normal limits   COVID-19 VIRUS (CORONAVIRUS) BY PCR   BLOOD CULTURE   BLOOD CULTURE   CBC WITH PLATELETS & DIFFERENTIAL    Narrative:     The following orders were created for panel order CBC with platelets differential.  Procedure                               Abnormality         Status                     ---------                                -----------         ------                     CBC with platelets and d...[[id number]]  Abnormal            Final result                 Please view results for these tests on the individual orders.     Emergency Department Course:  Reviewed:  I reviewed nursing notes, vitals, past history and care everywhere    Assessments:  2333 I obtained history and examined the patient as noted above.     0108 I rechecked and updated the patient regarding placement for the patient.    Consults:   0106 I spoke with the hospitalist, Dr. Mc, regarding placement for the patient.    Interventions:  Medications   morphine (PF) injection 6 mg (has no administration in time range)   ondansetron (ZOFRAN) injection 4 mg (has no administration in time range)   clindamycin (CLEOCIN) infusion 900 mg (900 mg Intravenous New Bag 10/16/21 0044)   iopamidol (ISOVUE-370) solution 80 mL (80 mLs Intravenous Given 10/16/21 0014)   Saline flush (60 mLs Intravenous Given 10/16/21 0014)     Disposition:  The patient was admitted to the hospital under the care of Dr. Mc.    Impression & Plan      Medical Decision Making:  This is a very pleasant 76-year-old male who presents to emergency room with appears to be facial cellulitis.  This all started on his ear, and he may have nicked it leading to an ear infection that is now involving the facial structures.  A CT scan shows cellulitis, no evidence of gas-forming organisms, this does not fit with necrotizing fasciitis as well given how well he looks clinically, however he is mentating, his labs, and physical exam.  I did however give clindamycin and I do think he needs to be admitted to the hospitalist team here, and I have admitted him to the care of Dr. Mc who is kindly agreed accept care of the patient.  We will plan for admission as above.    Diagnosis:    ICD-10-CM    1. Cellulitis, unspecified cellulitis site  L03.90          Scribe Disclosure:  I, Luigi Ellington, am  serving as a scribe at 11:33 PM on 10/15/2021 to document services personally performed by Jigar Cutler MD based on my observations and the provider's statements to me.      Jigar Cutler MD  10/16/21 0201

## 2021-10-17 NOTE — CONSULTS
Infectious Diseases Consultation  October 17, 2021  Amado Gutierrez MRN# [id number]   Age: 76 year old YOB: 1945   Date of Admission: 10/15/2021     Reason for consult: I was asked to see this patient for evaluation of facial cellulitis          Requesting physician Jesusita              Past Medical History:  Past Medical History:   Diagnosis Date     Hypertension        Past Surgical History:  Past Surgical History:   Procedure Laterality Date     COLONOSCOPY N/A 5/24/2019    Procedure: COLONOSCOPY, WITH POLYPECTOMY AND BIOPSY;  Surgeon: Eric Andrews MD;  Location:  GI     ORTHOPEDIC SURGERY      right rotator cuff       History of Present Illness:  76 year old male admitted on 10/15/2021. He has a PMH most remarkable for HTN, Alzheimer's disease who presented to the ER with a facial rash. He was admitted for treatment of facial cellulitis.     Patient reports that two days ago, he developed a rash at the base of his left ear and that it gradually spread over the next two days. His daughter brought him in to see urgent care today and he was referred to the ER for evaluation. His main complaint is itchiness, however, he tells me the pain is quite mild. He has no other symptoms--no fevers, chills, chest pain, SOB, abdominal pain, nausea, vomiting, diarrhea. No changes in vision.\      Says 4-5 day h/o facial cellulitis, started L ear and spread across face around both eyes  Better today  No h/o facial cellulitis    Antibiotics:  Vancomycin  zosyn    Review of Systems: 10 point ROS o/w neg    Social History: lives alone in [facility] wine, no smoking, former alpine instructor  Social History     Tobacco Use     Smoking status: Never Smoker     Smokeless tobacco: Never Used   Substance Use Topics     Alcohol use: Not on file        Family History:  No H/o GAS infection.   Allergies:  This patient is allergic to has No Known Allergies.     Physical Exam:  Vitals were reviewed  Blood pressure  "130/82, pulse 56, temperature 97.9  F (36.6  C), temperature source Oral, resp. rate 20, height 1.753 m (5' 9\"), weight 92.3 kg (203 lb 8 oz), SpO2 95 %.  GEN: alert, O x 3,  demented  HEENT: L ear some dermatitis, no claudia cellulitis  Bilateral facial erythema around both eyes, involves forehead and under eyes and lateral/medial to both eyes, sharp demarcation c/w erysipylas,  Appears about 50 % better at least  LN: no neck LA  LUNG: CTA  COR:RRR  ABDOMEN: soft, NT, ND  EXT: no edema, no erythema, no cellulitis  MS: 5/5       Data:  CBC  Recent Labs   Lab 10/17/21  0859 10/15/21  2346 10/15/21  2346   WBC 6.9   < > 9.3   HGB 13.1*   < > 13.7     --  165    < > = values in this interval not displayed.       BMP  Recent Labs   Lab 10/17/21  0859 10/16/21  1429 10/15/21  2346     --  135   POTASSIUM 4.0  --  3.9   CHLORIDE 105  --  103   SUSAN 8.5  --  8.8   CO2 21  --  26   BUN 10  --  17   CR 0.80 0.91 0.88   *  --  122*       CULTURESNo results for input(s): CULT in the last 168 hours.   BC x 2 NG  Attestation:  I have reviewed today's vital signs, notes, medications, labs and imaging.    ASSESSMENT:  1. FACIAL CELLULITIS-Most likely Gr A streptococcus, less likely S.aureus or other Strep species(C, G or B), exam c/w Erysipelas, not ready for discharge, not likely MRSA or MSSA,  does not need GNB coverage of zosyn, onset L ear, some L ear dermatitis    2. ALZHEIMERS DEMENTIA    REC  1. Stop vancomycin, zosyn  2. unasyn 3 g iv q  Hours  3. Probably discharge tomorrow if continued improvement on augmentin  4. Follow exam  Attempting to reach his daughter Nan per his request, so far no answer  Thanks for asking me to see him!    LUÍS HERNANDEZ M.D.  O:[phone]   B:[phone]         "

## 2021-10-17 NOTE — PROGRESS NOTES
MD Notification    Notified Person: MD    Notified Person Name: Dr. Arambula     Notification Date/Time: 10/17/21 11:15    Notification Interaction: Paged to change discharge date     Purpose of Notification: New antibiotic orders, discharge date for tomorrow not today    Orders Received: Unasyn SUZANNE    Comments:

## 2021-10-17 NOTE — PLAN OF CARE
.Date/Time: October 17, 2021 3:46 AM   Reason for Admission: Facial rash    Cognitive/Mentation:AXO$    VS: VSS expect hypertensive at times.B/P: 144/82, T: 98.3, P: 82, R: 20     Gi:denies n/v  : continent   Pain:Denies pain      Skin: Edema to face and ear. Edema to LLE.  Activity:Independent, ambulated to bathroom  Diet:Regular      Discharge:pending clinical improvement     End of shift summary: No events overnight

## 2021-10-17 NOTE — PLAN OF CARE
Summary: Facial cellulitis  DATE & TIME: 10/16 3053-9331  Cognitive Concerns/ Orientation : AOx3 Alzheimers, cooperative, pleasant   BEHAVIOR & AGGRESSION TOOL COLOR: green  ABNL VS/O2: VSS, RA  MOBILITY: stand by assist, confusion (fall risk)  PAIN MANAGMENT: c/o shoulder pain. PRN tylenol @ 1800  DIET: Regular  BOWEL/BLADDER: Continent B&B  DRAIN/DEVICES: PIV left hand  SKIN: rash/cellulitis of face and ear   D/C DAY/GOALS/PLACE: 1-2 days discharge home once oral antibiotic plan is established  OTHER IMPORTANT INFO: n/a

## 2021-10-17 NOTE — PLAN OF CARE
PT: Orders received. Chart reviewed and discussed with care team.  PT not indicated due to OT screen for PT shows not current PT needs, will continue to be seen for OT services.  Defer discharge recommendations to OT.  Will complete orders.

## 2021-10-17 NOTE — PLAN OF CARE
Date/Time: 10/17/21 Days  Reason for Admission: Facial rash  Cognitive/Mentation:AO has alzheimers  VS: VSS, RA   Skin: Edema to face and ear. Edema to LLE.  Activity:Independent, ambulated to bathroom  Diet:Regular  Discharge:discharge home tomorrow 10/18/21  End of shift summary: Zosyn discharge, pt has unasyn ordered q6hrs starting tonight at 1600. Pt has shower this afternoon.

## 2021-10-17 NOTE — PROGRESS NOTES
10/17/21 0907   Quick Adds   Type of Visit Initial Occupational Therapy Evaluation   Living Environment   People in home alone   Current Living Arrangements   (Senior living apartment )   Transportation Anticipated family or friend will provide  (Pt typically drives )   Living Environment Comments Pt lives alone in senior living.   Self-Care   Usual Activity Tolerance good   Current Activity Tolerance moderate   Equipment Currently Used at Home none   Instrumental Activities of Daily Living (IADL)   Previous Responsibilities meal prep;housekeeping;shopping;medication management;driving   IADL Comments Pt recieves meals from senior living facility but prepares microwable meals Independently, stove top meals occassionaly and prepares coffee in [facility], pt reports independent with houskeeping and drives short distances. Pt recieves assistance from daughter and son in law with finances.    Disability/Function   Hearing Difficulty or Deaf no   Concentrating, Remembering or Making Decisions Difficulty yes   Walking or Climbing Stairs Difficulty no   Dressing/Bathing Difficulty no   Toileting issues no   Doing Errands Independently Difficulty (such as shopping) no   General Information   Onset of Illness/Injury or Date of Surgery 10/15/21   Referring Physician Vsaquez Mc MD   Patient/Family Therapy Goal Statement (OT) Home    Additional Occupational Profile Info/Pertinent History of Current Problem Per chart: Amado Guiterrez is a 76 year old male admitted on 10/15/2021. He has a PMH most remarkable for HTN, Alzheimer's disease who presented to the ER with a facial rash. He was admitted for treatment of facial cellulitis. See chart for details.    Existing Precautions/Restrictions no known precautions/restrictions   Cognitive Status Examination   Orientation Status orientation to person, place and time   Follows Commands WFL   Memory Deficit short-term memory   Transfers   Transfers sit-stand transfer;toilet  transfer   Sit-Stand Transfer   Sit-Stand [place] (Transfers) independent   Toilet Transfer   Type (Toilet Transfer) sit-stand;stand-sit   [place] Level (Toilet Transfer) modified independence   Balance   Balance Assessment no deficits were identified   Activities of Daily Living   BADL Assessment lower body dressing   Lower Body Dressing Assessment   [place] Level (Lower Body Dressing) independent   Position (Lower Body Dressing) edge of bed sitting   Clinical Impression   Criteria for Skilled Therapeutic Interventions Met (OT) yes;meets criteria   OT Diagnosis Limitations in I/ADLs due to cognition    OT Problem List-Impairments impacting ADL problems related to;cognition;activity tolerance impaired   Assessment of Occupational Performance 1-3 Performance Deficits   Identified Performance Deficits Decreased independence in I/ADLs- medication management, driving, meal prep (stove top)   Planned Therapy Interventions (OT) IADL retraining;cognition;ADL retraining   Clinical Decision Making Complexity (OT) low complexity   Therapy Frequency (OT) Daily   Predicted Duration of Therapy 4 days   Risk & Benefits of therapy have been explained evaluation/treatment results reviewed;care plan/treatment goals reviewed;risks/benefits reviewed;patient   OT Discharge Planning    OT Discharge Recommendation (DC Rec) Home with assist;home with home care occupational therapy   OT Rationale for DC Rec Home with 24/7 A/supervision in all I/ADLs and home OT (to address cognition and I/ADLs) and home RN (medication management). Recommend pt not drive until further cognitive testing is completed. If this level of A is not available then TCU should be considered. Pt may benefit from a discussion regarding more supported living.    Total Evaluation Time (Minutes)   Total Evaluation Time (Minutes) 12

## 2021-10-17 NOTE — PROGRESS NOTES
[facility]    Medicine Progress Note - Hospitalist Service       Date of Admission:  10/15/2021    Assessment & Plan              Amado Gutierrez is a 76 year old male admitted on 10/15/2021. He has a PMH most remarkable for HTN, Alzheimer's disease who presented to the ER with a facial rash. He was admitted for treatment of facial cellulitis.    1. Cellulitis of the face.  No crepitus, or concerning features on CT scan.  ID cons luted. Facial erythema markedly improved  - discontinue- Vanc Zozyn  Changed to Unasyn   --Blood cultures drawn in ER    Chronic Issues: Awaiting Pharmacy Med Rec  1. GERD  2. Alzheimer's Disease. Mildly forgetful  3. Essential HTN       Diet: Combination Diet Regular Diet Adult  Diet  Room Service    DVT Prophylaxis: Enoxaparin (Lovenox) SQ  Interiano Catheter: Not present  Central Lines: None  Code Status: Full Code      Disposition Plan   Expected discharge: 10/17/2021   recommended to prior living arrangement once likeley tomorrow, if facial cellulitis continues to improve.     The patient's care was discussed with the Bedside Nurse.    Jesse Arambula MD  Hospitalist Service  [facility]  Securely message with the [employer] Web Console (learn more here)  Text page via [employer] Paging/Directory      Clinically Significant Risk Factors Present on Admission               ______________________________________________________________________    Interval History   Feels better, erythema improved. No new complaints.     Data reviewed today: I reviewed all medications, new labs and imaging results over the last 24 hours. I personally reviewed no images or EKG's today.    Physical Exam   Vital Signs: Temp: 97.9  F (36.6  C) Temp src: Oral BP: 130/82 Pulse: 56   Resp: 20 SpO2: 95 % O2 Device: None (Room air)    Weight: 203 lbs 8 oz  General Appearance: Alert in NAD resting in bed  Respiratory:  CTA no wheezing or crackles  Cardiovascular: RRR S1  S2 normal  GI:  Soft NT/ND + BS   Skin:  Warm dry , no new rashes facial erythema an swelling improved form yertsrday      Data

## 2021-10-18 NOTE — DISCHARGE SUMMARY
[facility]  Discharge Summary        Amado Gutierrez MRN# [id number]   YOB: 1945 Age: 76 year old     Date of Admission: 10/15/2021  Date of Discharge: 10/18/2021  Admitting Physician: Vasquez Mc MD  Discharge Physician: Andres Marques MD     Primary Provider: Anatoly Gudino  Primary Care Physician Phone Number: [phone]         Discharge Diagnoses:   Cellulitis of the face, suspect erysipelas due to Strep.        Other Chronic Medical Problems:      1. Alzheimer's disease.  2. Hypertension (benign essential).       Allergies:       No Known Allergies        Discharge Medications:        Current Discharge Medication List      START taking these medications    Details   acetaminophen (TYLENOL) 650 MG suppository Place 1 suppository (650 mg) rectally every 6 hours as needed for mild pain or other (and adjunct with moderate or severe pain or per patient request)  Qty: 30 suppository, Refills: 0    Associated Diagnoses: Cellulitis, unspecified cellulitis site      amoxicillin-clavulanate (AUGMENTIN) 875-125 MG tablet Take 1 tablet by mouth 2 times daily for 7 days  Qty: 14 tablet, Refills: 0    Associated Diagnoses: Cellulitis, unspecified cellulitis site         CONTINUE these medications which have NOT CHANGED    Details   donepezil (ARICEPT) 10 MG tablet Take 10 mg by mouth At Bedtime      lisinopril (ZESTRIL) 40 MG tablet Take 40 mg by mouth daily      multivitamin, therapeutic (THERA-VIT) TABS tablet Take 1 tablet by mouth daily                 Discharge Instructions and Follow-Up:      Discharge Orders      Reason for your hospital stay    Facial cellulitis     Follow-up and recommended labs and tests     Follow up with primary care provider, Anatoly Gudino, within 7 days for hospital follow- up.  No follow up labs or test are needed.     Activity    Your activity upon discharge: activity as tolerated     Diet    Follow this diet upon  discharge: Orders Placed This Encounter      Combination Diet Regular Diet Adult           Consultations This Hospital Stay:      Infectious Disease.        Admission History:      Please see the H&P by Vasquez Mc MD on 10/15/2021 for complete details. Briefly, Amado Gutierrez is a 76 year old male with history including HTN and Alzheimer's disease, who presented 10/15/2021 with a facial rash and was found to have a facial cellulitis.        Problem Oriented Hospital Course:        Cellulitis of the face, suspect erysipelas due to Strep.  * Presented 10/15 with two day history of increasing facial rash that started from his left ear. On initial evaluation 10/15, pt afebrile, no crepitus on exam; WBC normal,. , lactate normal. CT facial 10/15 showed bilateral frontal scalp, glabella, nasal soft tissues, bilateral periorbital regions, mild bilateral maxillary soft tissues, left lateral facial soft tissues, left external ear demonstrates soft tissue swelling and inflammatory changes concerning for  cellulitis; no orbital post septal/orbital retrobulbar cellulitis; no drainable soft tissue abscess; fatty replaced left submandibular gland demonstrates mild patchy increased attenuation could suggest regional sparing or left submandibular sialadenitis; possible subtle left parotitis. Started on vanco and Zosyn on admit. ID consulted on admit.  * Antibiotics narrowed to Unasyn 10/17 by ID. ID suspected erysipelas from Strep.  * Improved cellulitis 10/18. CRP down to 33.5 on 10/18.  Recent Labs   Lab 10/18/21  0810 10/17/21  0859 10/15/21  2357 10/15/21  2346   WBC 6.2 6.9  --  9.3   LACT  --   --  0.6*  --    CRP 33.5* 68.1*  --  105.0*   - Discharge on Augmentin for 7 days (stop after 10/25).  - Appreciate ID help.    Alzheimer's disease.  - Continue donepezil.    Hypertension (benign essential).  [PTA: lisinopril 40 mg daily.]  - Continue lisinopril.    COVID-19 testing.  COVID-19 PCR Results    COVID-19  PCR Results 10/16/21   SARS CoV2 PCR Negative      Comments are available for some flowsheets but are not being displayed.         COVID-19 Antibody Results, Testing for Immunity    COVID-19 Antibody Results, Testing for Immunity   No data to display.                   Pending Results:        Unresulted Labs Ordered in the Past 30 Days of this Admission     Date and Time Order Name Status Description    10/15/2021 11:52 PM Blood Culture Peripheral Blood Preliminary     10/15/2021 11:52 PM Blood Culture Peripheral Blood Preliminary                 Discharge Disposition:      Discharged to home.        Discharge Time:      Less than 30 minutes.        Key Imaging Studies, Lab Findings and Procedures/Surgeries:        Results for orders placed or performed during the hospital encounter of 10/15/21   CT Facial Bones with Contrast    Narrative    EXAM: CT FACIAL BONES WITH CONTRAST  LOCATION: [facility]  DATE/TIME: 10/16/2021 12:19 AM    INDICATION: Significant swelling and erythema to forehead, eyes and left ear  COMPARISON: None.  CONTRAST: 80mL Isovue-370  TECHNIQUE: Routine CT Maxillofacial with IV contrast. Multiplanar reformats. Dose reduction techniques were used.     FINDINGS:  OSSEOUS STRUCTURES/SOFT TISSUES:   Bilateral frontal scalp, glabella, nasal soft tissues, bilateral periorbital regions, mild bilateral maxillary soft tissues, left lateral facial soft tissues, left external ear demonstrates soft tissue swelling and inflammatory changes concerning for   cellulitis. No orbital post septal/orbital retrobulbar cellulitis. No drainable soft tissue abscess. Bilateral globes are intact.    Fatty replaced left submandibular gland demonstrates mild patchy increased attenuation could suggest regional sparing or left submandibular sialadenitis. Possible subtle left parotitis.    ORBITAL CONTENTS: No acute abnormality.    SINUSES: Mild mucosal thickening scattered about the paranasal  sinuses. Right inferior maxillary sinus mild to moderate mucosal thickening.    VISUALIZED INTRACRANIAL CONTENTS: No acute abnormality.    OTHER:  Vascular calcifications. Dental amalgam resulting in streak artifact. Dental disease with scattered cavities and periapical lucencies. Bilateral submandibular glands are fatty replaced. Upper cervical spine spondylosis.      Impression    IMPRESSION:   1.  Bilateral frontal scalp, glabella, nasal soft tissues, bilateral periorbital regions, mild bilateral maxillary soft tissues, left lateral facial soft tissues, left external ear demonstrates soft tissue swelling and inflammatory changes concerning for   cellulitis.   2.  No orbital post septal/orbital retrobulbar cellulitis.   3.  No drainable soft tissue abscess.   4.  Fatty replaced left submandibular gland demonstrates mild patchy increased attenuation could suggest regional sparing or left submandibular sialadenitis.  5.  Possible subtle left parotitis.

## 2021-10-18 NOTE — PROGRESS NOTES
[facility]    Internal Medicine Hospitalist Progress Note  10/18/2021  I evaluated patient on the above date.    Andres Marques Jr., MD  [phone] (p)  Text Page  Vocera        Assessment & Plan New actions/orders today (10/18/2021) are underlined.    Amado Gutierrez is a 76 year old male with history including HTN and Alzheimer's disease, who presented 10/15/2021 with a facial rash and was found to have a facial cellulitis.    Cellulitis of the face, suspect erysipelas due to Strep.  * Presented 10/15 with two day history of increasing facial rash that started from his left ear. On initial evaluation 10/15, pt afebrile, no crepitus on exam; WBC normal,. , lactate normal. CT facial 10/15 showed bilateral frontal scalp, glabella, nasal soft tissues, bilateral periorbital regions, mild bilateral maxillary soft tissues, left lateral facial soft tissues, left external ear demonstrates soft tissue swelling and inflammatory changes concerning for  cellulitis; no orbital post septal/orbital retrobulbar cellulitis; no drainable soft tissue abscess; fatty replaced left submandibular gland demonstrates mild patchy increased attenuation could suggest regional sparing or left submandibular sialadenitis; possible subtle left parotitis. Started on vanco and Zosyn on admit. ID consulted on admit.  * Antibiotics narrowed to Unasyn 10/17 by ID. ID suspected erysipelas from Strep.  * Improved cellulitis 10/18. CRP down to 33.5 on 10/18.  Recent Labs   Lab 10/18/21  0810 10/17/21  0859 10/15/21  2357 10/15/21  2346   WBC 6.2 6.9  --  9.3   LACT  --   --  0.6*  --    CRP 33.5* 68.1*  --  105.0*   - Continue Unasyn (started 10/17).  - Plan discharge on Augmentin for 7 days.  - Appreciate ID help.    Alzheimer's disease.  - Continue donepezil.  - Re-orient as needed.  - Maintain normal day/night, sleep/wake cycles.  - Minimize sedating medications as able.    Hypertension (benign essential).  [PTA:  "lisinopril 40 mg daily.]  - Continue lisinopril.    COVID-19 testing.  COVID-19 PCR Results    COVID-19 PCR Results 10/16/21   SARS CoV2 PCR Negative      Comments are available for some flowsheets but are not being displayed.         COVID-19 Antibody Results, Testing for Immunity    COVID-19 Antibody Results, Testing for Immunity   No data to display.             Diet: Combination Diet Regular Diet Adult  Diet  Room Service    Prophylaxis: PCD's, ambulation.   Interiano Catheter: Not present  Central Lines: None  Code Status: Full Code    Disposition Plan   Expected discharge: today recommended to prior living arrangement.  Entered: Andres Marques MD 10/18/2021, 9:41 AM         Interval History   Doing well.  Facial cellulitis and swelling much improved.    -Data reviewed today: I reviewed all new labs and imaging over the last 24 hours. I personally reviewed no images or EKG's today.    Physical Exam    , Blood pressure (!) 158/86, pulse 65, temperature 97.5  F (36.4  C), temperature source Oral, resp. rate 16, height 1.753 m (5' 9\"), weight 92.3 kg (203 lb 8 oz), SpO2 93 %.  Vitals:    10/15/21 2151 10/16/21 1357   Weight: 87.1 kg (192 lb) 92.3 kg (203 lb 8 oz)     Vital Signs with Ranges  Temp:  [97.5  F (36.4  C)-98  F (36.7  C)] 97.5  F (36.4  C)  Pulse:  [54-79] 65  Resp:  [16] 16  BP: (130-158)/(78-91) 158/86  SpO2:  [93 %-98 %] 93 %  Patient Vitals for the past 24 hrs:   BP Temp Temp src Pulse Resp SpO2   10/18/21 0813 (!) 158/86 97.5  F (36.4  C) Oral 65 16 93 %   10/18/21 0518 130/78 97.8  F (36.6  C) Oral 57 16 98 %   10/18/21 0129 (!) 150/86 98  F (36.7  C) Oral 54 16 97 %   10/17/21 2121 (!) 142/91 97.9  F (36.6  C) Oral 72 16 97 %   10/17/21 1603 (!) 152/83 97.7  F (36.5  C) Oral 79 16 95 %     I/O's Last 24 hours  I/O last 3 completed shifts:  In: 690 [P.O.:690]  Out: -     Constitutional: Awake, alert, pleasant. Conversant.  Respiratory:   Cardiovascular:   GI:   Skin/Integumen: See image. Mild " erythema and mild swelling around his lower forehead and eyes with areas of flaking skin.  Other:            Data   Recent Labs   Lab 10/18/21  0810 10/17/21  0859 10/16/21  1429 10/15/21  2346 10/15/21  2346   WBC 6.2 6.9  --   --  9.3   HGB 14.1 13.1*  --   --  13.7   MCV 96 98  --   --  98    188  --   --  165    133  --   --  135   POTASSIUM 4.1 4.0  --   --  3.9   CHLORIDE 106 105  --   --  103   CO2 25 21  --   --  26   BUN 9 10  --   --  17   CR 0.77 0.80 0.91   < > 0.88   ANIONGAP 5 7  --   --  6   SUSAN 9.0 8.5  --   --  8.8   * 146*  --   --  122*   ALBUMIN 3.2* 3.0*  --    < > 3.3*   PROTTOTAL 7.9 7.6  --    < > 8.1   BILITOTAL 0.6 0.7  --    < > 0.5   ALKPHOS 56 55  --    < > 65   ALT 34 33  --    < > 35   AST 32 23  --    < > 20    < > = values in this interval not displayed.     Recent Labs   Lab Test 10/18/21  0810 10/17/21  0859 10/15/21  2346   * 146* 122*     Recent Labs   Lab 10/18/21  0810 10/17/21  0859 10/15/21  2346   CRP 33.5* 68.1* 105.0*         No results found for this or any previous visit (from the past 24 hour(s)).    Medications   All medications were reviewed.      ampicillin-sulbactam (UNASYN) IV  3 g Intravenous Q6H     enoxaparin ANTICOAGULANT  40 mg Subcutaneous Q24H     sodium chloride (PF)  3 mL Intracatheter Q8H     [DISCONTINUED] acetaminophen **OR** acetaminophen, acetaminophen, lidocaine 4%, lidocaine (buffered or not buffered), melatonin, ondansetron **OR** ondansetron, prochlorperazine **OR** prochlorperazine **OR** prochlorperazine, sodium chloride (PF)

## 2021-10-18 NOTE — PLAN OF CARE
DATE & TIME: 10/17/21, 2610 - 8213   Cognitive Concerns/ Orientation : A&O x 4, forgetful   BEHAVIOR & AGGRESSION TOOL COLOR: Green   ABNL VS/O2: BP elevated. Bradycardic. Other VSS on room air  MOBILITY: Independent  PAIN MANAGMENT: Denied  DIET: Regular  BOWEL/BLADDER: Continent  ABNL LAB/BG: NA  DRAIN/DEVICES: PIV SL  TELEMETRY RHYTHM: NA  SKIN: Edema and rash to face   TESTS/PROCEDURES: NA  D/C DATE: Possible today  OTHER IMPORTANT INFO: Daughter can transport patient home if he is discharge in the morning

## 2021-10-18 NOTE — PLAN OF CARE
Discharge    Patient discharged to home via daughter  Care plan note:  VSS.  No c/o chest pain or SOB.  Up ind in room.  Face and left ear cont to be red.  Swelling decreased.  Paperwork reviewed with pt.  Questions answered    Listed belongings gathered and given to patient (including from security/pharmacy). Yes  Care Plan and Patient education resolved: yes  Prescriptions if needed, hard copies sent with patient  Yes  Medication Bin checked and emptied on discharge Yes  SW/care coordinator/charge RN aware of discharge: Yes

## 2021-10-18 NOTE — PLAN OF CARE
Occupational Therapy Discharge Summary    Reason for therapy discharge:    Discharged to home.    Progress towards therapy goal(s). See goals on Care Plan in [facility] electronic health record for goal details.  Goals not met.  Barriers to achieving goals:   discharge from facility.    Therapy recommendation(s):    Home with 24/7 A/supervision in all I/ADLs and home OT (to address cognition and I/ADLs) and home RN (medication management). Recommend pt not drive until further cognitive testing is completed. If this level of A is not available then TCU should be considered.

## 2021-10-18 NOTE — PLAN OF CARE
Date/Time: 10/17/21   Reason for Admission: Facial cellulitis  Cognitive/Mentation:AO x 3-4, very forgetful --has alzheimers  VS: BP elevated , room air  Skin: Edema  lessened to face and ear than yesterday.    Activity:Independent, ambulated to bathroom  Diet:Regular  Discharge:discharge home tomorrow 10/18/21  End of shift summary: Yonis FRANCE'd on previous shift, now on IV Unasyn . Pt;s daughter here this evening and received an update on pt;s condition. She states she can provide a ride home for pt. Tomorrow if he is discharged in the morning.

## 2021-10-18 NOTE — PROGRESS NOTES
Case Management    Care Management has not been consulted and pt is not a high readmission risk pt.  He does have a history of Alzheimer's dementia.  Care Coordinator happened to look at the OT evaluation and noted pt had scored a 16/30 on the SLUMS with recommendation  Home with 24/7 A/supervision in all I/ADLs and home OT (to address cognition and I/ADLs) and home RN (medication management). Recommend pt not drive until further cognitive testing is completed. If this level of A is not available then TCU should be considered.   Nsg has been noting A&O with forgetfulness.  Writer called pt's Daughter Nan.  She is a L&D RN.  Pt resides in an independent apartment at [facility].  Nan has noticed cognition decline in the past three months and was planning to have further discussion with [facility] concerning increasing services.  She is off from work this week, so plans to spend more time with her father and having discussion on adding services.  Therapies recommended home RN and OT.  A text has been sent to Dr Marques concerning home care orders.  Nan did not have any home care preference.  A referral has been sent to [facility].  A PCP follow up appointment has been scheduled on Friiday 10/22/21 at 2:15 PM with Dr Gudino at the [facility].    Addendum 1555:  [facility] is not able to accept pt.  Pt will have [facility] ([phone]).  Appreciate help from [facility] in securing another home care agency.  They are not able to start care until 10/22/21.  Pt's Daughter Nan was updated on the home care and also follow-up appointment.  She is meeting with [facility] today to increase services to assisted living.    Criss Machado RN  Inpatient Care Management  [phone]

## 2021-10-18 NOTE — PROGRESS NOTES
Was informed by care coordinator that Pt scored a 16 on the SLUMS.  Spoke with daughter regarding patient needing 24 hour supervision.  Daughter stated she was aware this was a possibility and has been talking with her sister and the senior living about transitioning him to Assisted living.  Daughter did agree to starting home care services.  Her and other family members will be checking on him until services are in place.

## 2024-04-24 NOTE — PROGRESS NOTES
----- Message from Timothy Jansen sent at 4/24/2024  4:29 PM CDT -----  Regarding: return call  PATIENT CALL    Pt returned Elva's call. Says that she keeps missing the call but would appreciate you just scheduling any appt that's available next week and she will confirm via patient portal.   [facility]    Infectious Disease Progress Note    Date of Service : 10/18/2021     Assessment:  1. Facial cellulitis/erysipelas, likely streptococcal. Resolving on IV antibiotics  2. CT suggestive of left parotitis. ? Source for infection, no parotid gland pain or tenderness however  3. Alzheimer's demential and HTN    Recommendations:  Unasyn to Augmentin at discharge for another week  Facial rash improving, ok to discharge from the ID stand point    ID will sign off, please call with questions PRN    Traci Sanders MD    Interval History    Patient was seen and examined, chart reviewed  Feels better, facial rash improving, edema resolved, resolving erythema and has some skin desquamation, no fever or pain    Antimicrobial therapy  10/17 unasyn  prior  10/16 -10/17 Clinda, Zozn, vancomycin    Physical Exam   Temp: 97.5  F (36.4  C) Temp src: Oral BP: (!) 158/86 Pulse: 65   Resp: 16 SpO2: 93 % O2 Device: None (Room air)    Vitals:    10/15/21 2151 10/16/21 1357   Weight: 87.1 kg (192 lb) 92.3 kg (203 lb 8 oz)     Vital Signs with Ranges  Temp:  [97.5  F (36.4  C)-98  F (36.7  C)] 97.5  F (36.4  C)  Pulse:  [54-79] 65  Resp:  [16] 16  BP: (130-158)/(78-91) 158/86  SpO2:  [93 %-98 %] 93 %    Constitutional: Awake, alert, cooperative, no apparent distress\  Face : Rash over forehead and under both eyes improving, skin desquamation left ear and forehead  Lungs: Clear to auscultation bilaterally, no crackles or wheezing  Cardiovascular: Regular rate and rhythm, normal S1 and S2, and no murmur noted  Abdomen: Normal bowel sounds, soft, non-distended, non-tender  Skin: No rashes, no cyanosis, no edema  Other:    Medications       ampicillin-sulbactam (UNASYN) IV  3 g Intravenous Q6H     enoxaparin ANTICOAGULANT  40 mg Subcutaneous Q24H     sodium chloride (PF)  3 mL Intracatheter Q8H       Data   All microbiology laboratory data reviewed.  Recent Labs   Lab Test 10/18/21  0810 10/17/21  0859  10/15/21  2346   WBC 6.2 6.9 9.3   HGB 14.1 13.1* 13.7   HCT 40.5 39.1* 40.0   MCV 96 98 98    188 165     Recent Labs   Lab Test 10/18/21  0810 10/17/21  0859 10/16/21  1429   CR 0.77 0.80 0.91     Recent Labs   Lab Test 10/18/21  0810   SED 53*     Microbiology  10/16 blood cxs negative    Imaging  10/16 Ct facial bones  EXAM: CT FACIAL BONES WITH CONTRAST  LOCATION: [facility]  DATE/TIME: 10/16/2021 12:19 AM     INDICATION: Significant swelling and erythema to forehead, eyes and left ear  COMPARISON: None.  CONTRAST: 80mL Isovue-370  TECHNIQUE: Routine CT Maxillofacial with IV contrast. Multiplanar reformats. Dose reduction techniques were used.      FINDINGS:  OSSEOUS STRUCTURES/SOFT TISSUES:   Bilateral frontal scalp, glabella, nasal soft tissues, bilateral periorbital regions, mild bilateral maxillary soft tissues, left lateral facial soft tissues, left external ear demonstrates soft tissue swelling and inflammatory changes concerning for   cellulitis. No orbital post septal/orbital retrobulbar cellulitis. No drainable soft tissue abscess. Bilateral globes are intact.     Fatty replaced left submandibular gland demonstrates mild patchy increased attenuation could suggest regional sparing or left submandibular sialadenitis. Possible subtle left parotitis.     ORBITAL CONTENTS: No acute abnormality.     SINUSES: Mild mucosal thickening scattered about the paranasal sinuses. Right inferior maxillary sinus mild to moderate mucosal thickening.     VISUALIZED INTRACRANIAL CONTENTS: No acute abnormality.     OTHER:  Vascular calcifications. Dental amalgam resulting in streak artifact. Dental disease with scattered cavities and periapical lucencies. Bilateral submandibular glands are fatty replaced. Upper cervical spine spondylosis.                                                                      IMPRESSION:   1.  Bilateral frontal scalp, glabella, nasal soft tissues, bilateral  periorbital regions, mild bilateral maxillary soft tissues, left lateral facial soft tissues, left external ear demonstrates soft tissue swelling and inflammatory changes concerning for   cellulitis.   2.  No orbital post septal/orbital retrobulbar cellulitis.   3.  No drainable soft tissue abscess.   4.  Fatty replaced left submandibular gland demonstrates mild patchy increased attenuation could suggest regional sparing or left submandibular sialadenitis.  5.  Possible subtle left parotitis.   Detail Level: Detailed Size Of Lesion: 6mm

## (undated) RX ORDER — FENTANYL CITRATE 50 UG/ML
INJECTION, SOLUTION INTRAMUSCULAR; INTRAVENOUS
Status: DISPENSED
Start: 2019-05-24